# Patient Record
(demographics unavailable — no encounter records)

---

## 2024-11-11 NOTE — REASON FOR VISIT
[FreeTextEntry1] : PCP/Referring Doctor: Dr Joao Lopez Chief Complaint: " light headed ness and for evaluation" ------------------------------------------------------------------------------------------------------------------------------------ History of Present Illness:  61F with previous history of schwannoma s/p surgery, possible vestibular dysfunction, HTN, hypothyroidism present with palpitations associated with dizziness. No prior cardiac evaluation.  TSH mildly elevated but T4 within normal limits.    No syncope No obvious triggers of her symptoms.   ROS:  chest pain (-), dyspnea with exertion (+), orthopnea (-), edema (-), palpitations (+), dizziness (+) All other systems were reviewed and are negative. ------------------------------------------------------------------------------------------------------------------------------------ Past Medical History: Any history of HTN? Yes Any history of Lipid disorders? No TG  LDL  NA Any history Diabetes or Prediabetes? No HbA1c  NA Any history of MI, stroke or TIA? NO  Any prior Stress Testing?  5 years ago Any prior Cath/Angiogram procedure? No Any prior Echocardiogram (TTE)? 5 years Any prior vascular study? No Have patient been on blood thinners? No Have patient had cardiac or vascular surgeries? No ------------------------------------------------------------------------------------------------------------------------------------ Patient's current cardiovascular medications were reviewed and updated in chart. Yes ------------------------------------------------------------------------------------------------------------------------------------ Family history Do you have a family history of heart attacks and/or stroke before the age of 60? No Do you have a family history of cardiac arrest? No ------------------------------------------------------------------------------------------------------------------------------------ Social History: Do you currently or previously used tobacco including cigarettes and vapes? No How many alcoholic drinks per week? 0-3, What is your occupation?   ------------------------------------------------------------------------------------------------------------------------------------ Physical Exam: General appearance: NAD Lungs: CTAB CV: RRR Extremities: No peripheral edema.

## 2024-11-11 NOTE — ASSESSMENT
[FreeTextEntry1] : Problems Assessed: 1. Palpitations 2. HTN 3. HLD  Plan: -  ECG shows normal sinus rhythm. No ST elevations or depressions are noted. -  Echocardiogram for evaluation of cardiac structure and function.  - Event monitor x7 days  - Neurological evaluation is recommended given history of malignancy  Follow-up: 1 month  ----------------------------------------------------------------------------------------- Billing Statement: ECG obtained in the diagnosis and treatment of assessed problems.  Total time spent on this encounter was 45 minutes. This includes non-face-to-face time before the visit when I reviewed relevant portions of the patient's medical record and time spent on documentation after the visit. During face-to-face time, I took a relevant history, examined the patient and explained differential diagnoses, relevant cardiac diagnoses, workup, and management plan.